# Patient Record
Sex: MALE | Race: WHITE
[De-identification: names, ages, dates, MRNs, and addresses within clinical notes are randomized per-mention and may not be internally consistent; named-entity substitution may affect disease eponyms.]

---

## 2019-09-10 ENCOUNTER — HOSPITAL ENCOUNTER (EMERGENCY)
Dept: HOSPITAL 76 - ED | Age: 39
Discharge: HOME | End: 2019-09-10
Payer: COMMERCIAL

## 2019-09-10 VITALS — SYSTOLIC BLOOD PRESSURE: 132 MMHG | DIASTOLIC BLOOD PRESSURE: 82 MMHG

## 2019-09-10 DIAGNOSIS — S39.011A: Primary | ICD-10-CM
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
## 2019-09-10 DIAGNOSIS — K40.90: ICD-10-CM

## 2019-09-10 LAB
ALBUMIN DIAFP-MCNC: 4.3 G/DL (ref 3.2–5.5)
ALBUMIN/GLOB SERPL: 1.2 {RATIO} (ref 1–2.2)
ALP SERPL-CCNC: 55 IU/L (ref 42–121)
ALT SERPL W P-5'-P-CCNC: 24 IU/L (ref 10–60)
ANION GAP SERPL CALCULATED.4IONS-SCNC: 10 MMOL/L (ref 6–13)
AST SERPL W P-5'-P-CCNC: 25 IU/L (ref 10–42)
BASOPHILS NFR BLD AUTO: 0.1 10^3/UL (ref 0–0.1)
BASOPHILS NFR BLD AUTO: 0.7 %
BILIRUB BLD-MCNC: 1.4 MG/DL (ref 0.2–1)
BUN SERPL-MCNC: 21 MG/DL (ref 6–20)
CALCIUM UR-MCNC: 9.2 MG/DL (ref 8.5–10.3)
CHLORIDE SERPL-SCNC: 100 MMOL/L (ref 101–111)
CLARITY UR REFRACT.AUTO: CLEAR
CO2 SERPL-SCNC: 28 MMOL/L (ref 21–32)
CREAT SERPLBLD-SCNC: 1.2 MG/DL (ref 0.6–1.2)
EOSINOPHIL # BLD AUTO: 0.1 10^3/UL (ref 0–0.7)
EOSINOPHIL NFR BLD AUTO: 1.8 %
ERYTHROCYTE [DISTWIDTH] IN BLOOD BY AUTOMATED COUNT: 12.1 % (ref 12–15)
GFRSERPLBLD MDRD-ARVRAT: 68 ML/MIN/{1.73_M2} (ref 89–?)
GLOBULIN SER-MCNC: 3.5 G/DL (ref 2.1–4.2)
GLUCOSE SERPL-MCNC: 97 MG/DL (ref 70–100)
GLUCOSE UR QL STRIP.AUTO: NEGATIVE MG/DL
HGB UR QL STRIP: 17.6 G/DL (ref 14–18)
KETONES UR QL STRIP.AUTO: 40 MG/DL
LIPASE SERPL-CCNC: 26 U/L (ref 22–51)
LYMPHOCYTES # SPEC AUTO: 1.6 10^3/UL (ref 1.5–3.5)
LYMPHOCYTES NFR BLD AUTO: 21.7 %
MCH RBC QN AUTO: 31 PG (ref 27–31)
MCHC RBC AUTO-ENTMCNC: 34.9 G/DL (ref 32–36)
MCV RBC AUTO: 88.9 FL (ref 80–94)
MONOCYTES # BLD AUTO: 0.5 10^3/UL (ref 0–1)
MONOCYTES NFR BLD AUTO: 6.9 %
NEUTROPHILS # BLD AUTO: 4.9 10^3/UL (ref 1.5–6.6)
NEUTROPHILS # SNV AUTO: 7.2 X10^3/UL (ref 4.8–10.8)
NEUTROPHILS NFR BLD AUTO: 68.6 %
NITRITE UR QL STRIP.AUTO: NEGATIVE
PDW BLD AUTO: 9.6 FL (ref 7.4–11.4)
PH UR STRIP.AUTO: 8 PH (ref 5–7.5)
PLATELET # BLD: 277 10^3/UL (ref 130–450)
PROT SPEC-MCNC: 7.8 G/DL (ref 6.7–8.2)
PROT UR STRIP.AUTO-MCNC: NEGATIVE MG/DL
RBC # UR STRIP.AUTO: NEGATIVE /UL
RBC MAR: 5.67 10^6/UL (ref 4.7–6.1)
SODIUM SERPLBLD-SCNC: 138 MMOL/L (ref 135–145)
SP GR UR STRIP.AUTO: 1.01 (ref 1–1.03)
UROBILINOGEN UR QL STRIP.AUTO: (no result) E.U./DL
UROBILINOGEN UR STRIP.AUTO-MCNC: NEGATIVE MG/DL

## 2019-09-10 PROCEDURE — 36415 COLL VENOUS BLD VENIPUNCTURE: CPT

## 2019-09-10 PROCEDURE — 83690 ASSAY OF LIPASE: CPT

## 2019-09-10 PROCEDURE — 81001 URINALYSIS AUTO W/SCOPE: CPT

## 2019-09-10 PROCEDURE — 80053 COMPREHEN METABOLIC PANEL: CPT

## 2019-09-10 PROCEDURE — 99282 EMERGENCY DEPT VISIT SF MDM: CPT

## 2019-09-10 PROCEDURE — 99283 EMERGENCY DEPT VISIT LOW MDM: CPT

## 2019-09-10 PROCEDURE — 85025 COMPLETE CBC W/AUTO DIFF WBC: CPT

## 2019-09-10 PROCEDURE — 81003 URINALYSIS AUTO W/O SCOPE: CPT

## 2019-09-10 PROCEDURE — 87086 URINE CULTURE/COLONY COUNT: CPT

## 2019-09-10 NOTE — ED PHYSICIAN DOCUMENTATION
PD HPI ABD PAIN





- Stated complaint


Stated Complaint: ABD/GROIN PX





- Chief complaint


Chief Complaint: Abd Pain





- History obtained from


History obtained from: Patient





- History of Present Illness


Timing - onset: How many days ago (2)


Timing - duration: Days (2)


Timing - details: Gradual onset, Still present


Quality: Sharp, Pain


Location: RLQ


Radiation: 


Improved by: Laying still


Worsened by: Moving, Position, Palpation


Associated symptoms: No: Nausea, Vomiting, Diarrhea, Constipation


Similar symptoms before: Has not had sx before


Recently seen: Not recently seen





- Additional information


Additional information: 


38-year-old male with a 2-day history of pain in his right lower quadrant that 

is worse with sneezing coughing and sitting up.  He is able to eat he has not 

had fever he has not had diarrhea or vomiting.  He does not feel a mass in the 

inguinal area.  He has not had these symptoms previously.








Review of Systems


Constitutional: denies: Fever


Ears: denies: Ear pain


Nose: denies: Congestion


Throat: denies: Sore throat


Cardiac: denies: Chest pain / pressure, Palpitations


Respiratory: reports: Cough.  denies: Dyspnea


GI: reports: Abdominal Pain.  denies: Nausea, Vomiting, Constipation, Diarrhea


: denies: Dysuria, Frequency


Skin: denies: Rash


Musculoskeletal: denies: Neck pain, Back pain, Extremity pain


Neurologic: denies: Generalized weakness, Focal weakness, Numbness





PD PAST MEDICAL HISTORY





- Past Medical History


Past Medical History: No


GI: GERD





- Past Surgical History


Past Surgical History: No





- Present Medications


Home Medications: 


                                Ambulatory Orders











 Medication  Instructions  Recorded  Confirmed


 


Rabeprazole Sodium [Aciphex] 1 tab PO DAILY 02/14/17 02/14/17














- Allergies


Allergies/Adverse Reactions: 


                                    Allergies











Allergy/AdvReac Type Severity Reaction Status Date / Time


 


No Known Drug Allergies Allergy   Verified 02/14/17 16:08














- Social History


Does the pt smoke?: No


Smoking Status: Never smoker


Does the pt drink ETOH?: Yes


Does the pt have substance abuse?: No





- Immunizations


Immunizations are current?: Yes





PD ED PE NORMAL





- Vitals


Vital signs reviewed: Yes (hypertensive mild )





- General


General: Alert and oriented X 3, No acute distress, Well developed/nourished





- HEENT


HEENT: Atraumatic, PERRL, EOMI





- Neck


Neck: Supple, no meningeal sign





- Cardiac


Cardiac: RRR, No murmur





- Respiratory


Respiratory: No respiratory distress, Clear bilaterally





- Abdomen


Abdomen: Normal bowel sounds, Soft, Non tender, Non distended, No organomegaly, 

Other (deep palpation of the right lower abdominal wall reproduces the pain the 

patient is experiencing with movement. )





- Male 


Male : Other (testicles decended bilat non-tender. There is herniation against

 the finger in the inguinal cannal but without pain to the patient. The pain he 

is experiencing is over the abdominal wall laterally )





- Back


Back: No CVA TTP, No spinal TTP





- Derm


Derm: Normal color, Warm and dry, No rash





- Extremities


Extremities: No deformity, No edema





- Neuro


Neuro: Alert and oriented X 3, CNs 2-12 intact, No motor deficit, No sensory 

deficit, Normal speech


Eye Opening: Spontaneous


Motor: Obeys Commands


Verbal: Oriented


GCS Score: 15





- Psych


Psych: Normal mood, Normal affect





Results





- Vitals


Vitals: 





                               Vital Signs - 24 hr











  09/10/19 09/10/19





  09:37 11:21


 


Temperature 36.4 C L 37.1 C


 


Heart Rate 92 78


 


Respiratory 18 16





Rate  


 


Blood Pressure 135/88 H 132/82 H


 


O2 Saturation 100 98








                                     Oxygen











O2 Source                      Room air

















- Labs


Labs: 





                                Laboratory Tests











  09/10/19 09/10/19 09/10/19





  10:15 10:15 10:45


 


WBC  7.2  


 


RBC  5.67  


 


Hgb  17.6  


 


Hct  50.4  


 


MCV  88.9  


 


MCH  31.0  


 


MCHC  34.9  


 


RDW  12.1  


 


Plt Count  277  


 


MPV  9.6  


 


Neut # (Auto)  4.9  


 


Lymph # (Auto)  1.6  


 


Mono # (Auto)  0.5  


 


Eos # (Auto)  0.1  


 


Baso # (Auto)  0.1  


 


Absolute Nucleated RBC  0.00  


 


Nucleated RBC %  0.0  


 


Sodium   138 


 


Potassium   3.9 


 


Chloride   100 L 


 


Carbon Dioxide   28 


 


Anion Gap   10.0 


 


BUN   21 H 


 


Creatinine   1.2 


 


Estimated GFR (MDRD)   68 L 


 


Glucose   97 


 


Calcium   9.2 


 


Total Bilirubin   1.4 H 


 


AST   25 


 


ALT   24 


 


Alkaline Phosphatase   55 


 


Total Protein   7.8 


 


Albumin   4.3 


 


Globulin   3.5 


 


Albumin/Globulin Ratio   1.2 


 


Lipase   26 


 


Urine Color    YELLOW


 


Urine Clarity    CLEAR


 


Urine pH    8.0 H


 


Ur Specific Gravity    1.015


 


Urine Protein    NEGATIVE


 


Urine Glucose (UA)    NEGATIVE


 


Urine Ketones    40 H


 


Urine Occult Blood    NEGATIVE


 


Urine Nitrite    NEGATIVE


 


Urine Bilirubin    NEGATIVE


 


Urine Urobilinogen    1 (NORMAL)


 


Ur Leukocyte Esterase    NEGATIVE


 


Ur Microscopic Review    NOT INDICATED


 


Urine Culture Comments    NOT INDICATED














PD MEDICAL DECISION MAKING





- ED course


Complexity details: reviewed results, re-evaluated patient, considered 

differential, d/w patient


ED course: 


38-year-old male was begun to develop in a pain in the right lower quadrant of 

his abdomen that is worse with any type of movement coughing or sneezing.  He 

states when he goes to sit up he has pain if he is sitting still his pain is 

present but unremarkable.  He does not feel any herniation or mass at but he 

does have some pain referred to the right testicle with coughing and sneezing.  

He has been quite active and is physically fit. On examination he does have some

 herniation but does not cause tenderness.  He does not have any tenderness to 

the right testicle.  He does have some tenderness to the abdominal wall and I 

suspect he has had an abdominal wall muscle strain.  His diagnostics are 

otherwise unremarkable and suspicion for appendicitis is nil.








Departure





- Departure


Disposition: 01 Home, Self Care


Clinical Impression: 


Abdominal wall strain


Qualifiers:


 Encounter type: initial encounter Qualified Code(s): S39.011A - Strain of 

muscle, fascia and tendon of abdomen, initial encounter





Condition: Stable


Instructions:  ED Strain Abdominal Muscle


Follow-Up: 


Adalberto Hubbard ARNP [Primary Care Provider] -